# Patient Record
Sex: FEMALE | Race: WHITE
[De-identification: names, ages, dates, MRNs, and addresses within clinical notes are randomized per-mention and may not be internally consistent; named-entity substitution may affect disease eponyms.]

---

## 2019-06-03 ENCOUNTER — HOSPITAL ENCOUNTER (OUTPATIENT)
Dept: HOSPITAL 84 - D.LABREF | Age: 74
Discharge: HOME | End: 2019-06-03
Attending: INTERNAL MEDICINE
Payer: MEDICARE

## 2019-06-03 DIAGNOSIS — I10: ICD-10-CM

## 2019-06-03 DIAGNOSIS — E78.5: Primary | ICD-10-CM

## 2019-06-03 LAB
ALT SERPL-CCNC: 55 U/L (ref 10–68)
CHOLEST/HDLC SERPL: 2.1 RATIO (ref 2.3–4.1)
HDLC SERPL-MCNC: 103 MG/DL (ref 32–96)
LDL-HDL RATIO: 1 RATIO (ref 1.5–3.5)
LDLC SERPL-MCNC: 102 MG/DL (ref 0–100)
TRIGL SERPL-MCNC: 61 MG/DL (ref 30–200)

## 2019-11-04 ENCOUNTER — HOSPITAL ENCOUNTER (OUTPATIENT)
Dept: HOSPITAL 84 - D.HCCECHO | Age: 74
End: 2019-11-04
Attending: INTERNAL MEDICINE
Payer: MEDICARE

## 2019-11-04 DIAGNOSIS — R01.1: Primary | ICD-10-CM

## 2019-11-06 NOTE — EC
PATIENT:SURESH ORLANDO                 DATE OF SERVICE: 11/04/19
SEX: F                                  MEDICAL RECORD: [id number]
DATE OF BIRTH: [birth year]                        LOCATION:D[facility]          
AGE OF PATIENT: 74                             ADMISSION DATE: 11/04/19
 
REFERRING PHYSICIAN:                               
 
INTERPRETING PHYSICIAN: DASHA AKHTAR MD          
 
 
 
                             ECHOCARDIOGRAM REPORT
  ECHO CHARGES 4               ECHO COMPLETE                 Date: 11/04/19
 
 
 
CLINICAL DIAGNOSIS: HEART MURMUR                  
 
                         ECHOCARDIOGRAPHIC MEASUREMENTS
      (adult normal given)
   AC root (d.<3.7cm) 3.0  cm   LV Septum d (<1.2 cm> 1.3  cm
      Valve Excursion 1.5  cm     LV Septum (systole) 1.4  cm
Left Atria (s.<4.0cm> 3.0  cm          LVPW d(<1.2cm) 1.3  cm
        RV (d.<2.3cm) 3.2  cm           LVPW (sytole) 1.7  cm
  LV diastole(<5.6CM) 3.6  cm       MV E-F(>70mm/sec)      cm
           LV systole 2.5  cm           LVOT Diameter 1.6  cm
       MV exc.(>10mm) 1.4  cm
Est.ejection fraction (50-75%)     %
 
   DOPPLER:
     LVIT      cm/sec A 82.0 cm/sec E 52.0  cm/sec
       LA      cm/sec      RVSP 37   mmHg
     LVOT 116  cm/sec   AOP1/2T      m/s
  Asc. Ao 141  cm/sec
     RVOT 71   cm/sec
       RA      cm/sec
       PA 88   cm/sec
 AV Gradient Peak 8.00 mmHg  AV Mean 5.01 mmHg  AV Area 1.7  cm
 MV Gradient Peak 3.12 mmHg  MV Mean 1.24 mmHg  MV Area      cm
   COMMENTS:                                              
 
 
 Cardiac Sonographer: 2               CARLOS TSANG              
      Cardiologist: 3          Dr. Chambers             
             TAPE# PACS           
                                       Pericardial Effusion N                        
 
 
DATE OF SERVICE:  
 
Adequate 2D, color flow, spectral Doppler and M-Mode.  Mild LVH.  LV internal
dimension are normal.  Wall motion is normal.  EF is greater than or equal to
55%.  Aortic valve is tricuspid.  No evidence of stenosis by Doppler
interrogation.  Trace AI by color flow imaging.  Left atrium is normal.  Mitral
valve shows no prolapse.  Trace MR.  Right-sided chambers grossly normal.  Mild
TR.
 
TRANSINT:[id number] Voice Confirmation ID: [id number] DOCUMENT ID: [id number]
 
 
 
ECHOCARDIOGRAM REPORT                          [id number]    JOHANNYSURESH CELAYA,DASHA PEÑA MD          
 
 
 
Electronically Signed by DASHA AKHTAR on 11/06/19 at 1338
 
 
 
 
 
 
 
 
 
 
 
 
 
 
 
 
 
 
 
 
 
 
 
 
 
 
 
 
 
 
 
 
 
 
 
 
 
 
 
 
CC:                                                             [id number]
DICTATION DATE: 11/05/19 1500     :     11/06/19 0003      DEP CLI 
                                                                      11/04/19
[id number] [place] [address]

## 2020-06-08 ENCOUNTER — HOSPITAL ENCOUNTER (OUTPATIENT)
Dept: HOSPITAL 84 - D.HCCARDIO | Age: 75
Discharge: HOME | End: 2020-06-08
Attending: INTERNAL MEDICINE
Payer: MEDICARE

## 2020-06-08 DIAGNOSIS — I20.9: Primary | ICD-10-CM

## 2021-06-03 ENCOUNTER — HOSPITAL ENCOUNTER (OUTPATIENT)
Dept: HOSPITAL 84 - D.HCCARDIO | Age: 76
Discharge: HOME | End: 2021-06-03
Attending: INTERNAL MEDICINE
Payer: MEDICARE

## 2021-06-03 DIAGNOSIS — R07.9: Primary | ICD-10-CM
